# Patient Record
Sex: MALE | Race: WHITE | URBAN - METROPOLITAN AREA
[De-identification: names, ages, dates, MRNs, and addresses within clinical notes are randomized per-mention and may not be internally consistent; named-entity substitution may affect disease eponyms.]

---

## 2019-08-21 ENCOUNTER — HOSPITAL ENCOUNTER (OUTPATIENT)
Dept: MEDSURG UNIT | Facility: HOSPITAL | Age: 59
End: 2019-08-22
Attending: SPECIALIST | Admitting: SPECIALIST

## 2019-08-21 LAB
ABS NEUT (OLG): 4 X10(3)/MCL (ref 1.5–6.9)
BASOPHILS # BLD AUTO: 0.1 X10(3)/MCL (ref 0–0.1)
BASOPHILS NFR BLD AUTO: 1 % (ref 0–1)
BUN SERPL-MCNC: 11 MG/DL (ref 10–20)
CALCIUM SERPL-MCNC: 9.3 MG/DL (ref 8–10.5)
CHLORIDE SERPL-SCNC: 104 MMOL/L (ref 100–108)
CO2 SERPL-SCNC: 29 MMOL/L (ref 21–35)
CREAT SERPL-MCNC: 1.06 MG/DL (ref 0.7–1.3)
CREAT/UREA NIT SERPL: 10
EOSINOPHIL # BLD AUTO: 0.3 X10(3)/MCL (ref 0–0.6)
EOSINOPHIL NFR BLD AUTO: 3 % (ref 0–5)
ERYTHROCYTE [DISTWIDTH] IN BLOOD BY AUTOMATED COUNT: 13.7 % (ref 11.5–17)
GLUCOSE SERPL-MCNC: 89 MG/DL (ref 75–116)
HCT VFR BLD AUTO: 40.7 % (ref 42–52)
HGB BLD-MCNC: 12.8 GM/DL (ref 14–18)
IMM GRANULOCYTES # BLD AUTO: 0.02 10*3/UL (ref 0–0.02)
IMM GRANULOCYTES NFR BLD AUTO: 0.2 % (ref 0–0.43)
LYMPHOCYTES # BLD AUTO: 3.6 X10(3)/MCL (ref 0.5–4.1)
LYMPHOCYTES NFR BLD AUTO: 42 % (ref 15–40)
MCH RBC QN AUTO: 29 PG (ref 27–34)
MCHC RBC AUTO-ENTMCNC: 31 GM/DL (ref 31–36)
MCV RBC AUTO: 92 FL (ref 80–99)
MONOCYTES # BLD AUTO: 0.6 X10(3)/MCL (ref 0–1.1)
MONOCYTES NFR BLD AUTO: 7 % (ref 4–12)
NEUTROPHILS # BLD AUTO: 4 X10(3)/MCL (ref 1.5–6.9)
NEUTROPHILS NFR BLD AUTO: 47 % (ref 43–75)
PLATELET # BLD AUTO: 246 X10(3)/MCL (ref 140–400)
PMV BLD AUTO: 9.5 FL (ref 6.8–10)
POTASSIUM SERPL-SCNC: 4.3 MMOL/L (ref 3.6–5.2)
RBC # BLD AUTO: 4.4 X10(6)/MCL (ref 4.7–6.1)
SODIUM SERPL-SCNC: 141 MMOL/L (ref 135–145)
WBC # SPEC AUTO: 8.4 X10(3)/MCL (ref 4.5–11.5)

## 2019-08-23 LAB — GRAM STN SPEC: NORMAL

## 2019-08-25 LAB
FINAL CULTURE: NO GROWTH
FINAL CULTURE: NORMAL

## 2022-04-30 NOTE — H&P
CHIEF COMPLAINT:  Right hand pain and swelling.    HISTORY OF PRESENT ILLNESS:  This is a 59-year-old male with right hand pain and swelling times several weeks.  He has difficulty flexing and extending his right hand and finger.  .    PAST MEDICAL HISTORY:  Positive for renal stones.    PAST SURGICAL HISTORY:  Includes cervical spine surgery x2, left shoulder surgery, __________ reconstruction.    FAMILY HISTORY:  Includes lung cancer, diabetes mellitus, coronary artery disease in his father.  Head and neck cancer in his mother.    SOCIAL HISTORY:  Positive for tobacco use.  He does not admit any alcohol use.    MEDICATIONS:  Listed in chart and reviewed.    ALLERGIES:  NO KNOWN DRUG ALLERGIES.      REVIEW OF SYSTEMS:  No recent fever or chills, malaise, or blackouts.    PHYSICAL EXAMINATION:  HEENT.  Head is normocephalic.  Eyes are clear.  Negative for red conjunctivae.  Ears:  No difficulty hearing.  Nose:  No mucosal discharge.  Throat:  No difficulty swallowing, speaking, or breathing.     NECK:  Supple.   CHEST:  No complaint of chest pain.  No complaint of dyspnea.  No complaint of shortness of breath at this time.     ABDOMEN:  Soft, benign.  No palpable masses.     EXTREMITIES:  His right hand is painful, swollen, and red.   __________ sensation to his fingertips.  He has difficulty flexing and extended his right long finger.     X-rays are normal, showed no fractures.    IMPRESSION:  Possible trigger finger, possible inflammation or infection, septic bone, flexor tendon slip of his right long finger.    PLAN:  I and D exploration of the right A1 pulley in the tendon sheath of his right hand today.        PRITESH/TASH   DD: 09/18/2019 1747   DT: 09/18/2019 1807  Job # [id number]